# Patient Record
Sex: MALE | Race: WHITE | Employment: OTHER | URBAN - METROPOLITAN AREA
[De-identification: names, ages, dates, MRNs, and addresses within clinical notes are randomized per-mention and may not be internally consistent; named-entity substitution may affect disease eponyms.]

---

## 2017-08-17 ENCOUNTER — LAB ENCOUNTER (OUTPATIENT)
Dept: LAB | Facility: HOSPITAL | Age: 62
End: 2017-08-17
Attending: SPECIALIST
Payer: MEDICARE

## 2017-08-17 DIAGNOSIS — D51.8 OTHER VITAMIN B12 DEFICIENCY ANEMIA: ICD-10-CM

## 2017-08-17 DIAGNOSIS — E55.9 VITAMIN D DEFICIENCY: ICD-10-CM

## 2017-08-17 DIAGNOSIS — M54.15 RADICULOPATHY OF THORACOLUMBAR REGION: ICD-10-CM

## 2017-08-17 DIAGNOSIS — N40.1 BPH ASSOCIATED WITH NOCTURIA: ICD-10-CM

## 2017-08-17 DIAGNOSIS — I10 ESSENTIAL HYPERTENSION, MALIGNANT: Primary | ICD-10-CM

## 2017-08-17 DIAGNOSIS — R35.1 BPH ASSOCIATED WITH NOCTURIA: ICD-10-CM

## 2017-08-17 LAB
25-HYDROXYVITAMIN D (TOTAL): 23.7 NG/ML (ref 30–100)
ALBUMIN SERPL-MCNC: 3.9 G/DL (ref 3.5–4.8)
ALP LIVER SERPL-CCNC: 70 U/L (ref 45–117)
ALT SERPL-CCNC: 17 U/L (ref 17–63)
AST SERPL-CCNC: 13 U/L (ref 15–41)
BASOPHILS # BLD AUTO: 0.06 X10(3) UL (ref 0–0.1)
BASOPHILS NFR BLD AUTO: 0.7 %
BILIRUB SERPL-MCNC: 0.3 MG/DL (ref 0.1–2)
BUN BLD-MCNC: 16 MG/DL (ref 8–20)
CALCIUM BLD-MCNC: 9.4 MG/DL (ref 8.3–10.3)
CHLORIDE: 106 MMOL/L (ref 101–111)
CHOLEST SMN-MCNC: 169 MG/DL (ref ?–200)
CO2: 28 MMOL/L (ref 22–32)
CREAT BLD-MCNC: 0.85 MG/DL (ref 0.7–1.3)
EOSINOPHIL # BLD AUTO: 0.41 X10(3) UL (ref 0–0.3)
EOSINOPHIL NFR BLD AUTO: 4.7 %
ERYTHROCYTE [DISTWIDTH] IN BLOOD BY AUTOMATED COUNT: 13.1 % (ref 11.5–16)
EST. AVERAGE GLUCOSE BLD GHB EST-MCNC: 123 MG/DL (ref 68–126)
FREE T4: 1.1 NG/DL (ref 0.9–1.8)
GLUCOSE BLD-MCNC: 89 MG/DL (ref 70–99)
HAV AB SERPL IA-ACNC: 432 PG/ML (ref 193–986)
HBA1C MFR BLD HPLC: 5.9 % (ref ?–5.7)
HCT VFR BLD AUTO: 42.4 % (ref 37–53)
HDLC SERPL-MCNC: 42 MG/DL (ref 45–?)
HDLC SERPL: 4.02 {RATIO} (ref ?–4.97)
HGB BLD-MCNC: 14.1 G/DL (ref 13–17)
IMMATURE GRANULOCYTE COUNT: 0.04 X10(3) UL (ref 0–1)
IMMATURE GRANULOCYTE RATIO %: 0.5 %
LDLC SERPL CALC-MCNC: 107 MG/DL (ref ?–130)
LDLC SERPL-MCNC: 20 MG/DL (ref 5–40)
LYMPHOCYTES # BLD AUTO: 3.13 X10(3) UL (ref 0.9–4)
LYMPHOCYTES NFR BLD AUTO: 35.5 %
M PROTEIN MFR SERPL ELPH: 7.3 G/DL (ref 6.1–8.3)
MCH RBC QN AUTO: 29.6 PG (ref 27–33.2)
MCHC RBC AUTO-ENTMCNC: 33.3 G/DL (ref 31–37)
MCV RBC AUTO: 89.1 FL (ref 80–99)
MONOCYTES # BLD AUTO: 0.85 X10(3) UL (ref 0.1–0.6)
MONOCYTES NFR BLD AUTO: 9.6 %
NEUTROPHIL ABS PRELIM: 4.32 X10 (3) UL (ref 1.3–6.7)
NEUTROPHILS # BLD AUTO: 4.32 X10(3) UL (ref 1.3–6.7)
NEUTROPHILS NFR BLD AUTO: 49 %
NONHDLC SERPL-MCNC: 127 MG/DL (ref ?–130)
PLATELET # BLD AUTO: 147 10(3)UL (ref 150–450)
POTASSIUM SERPL-SCNC: 4.1 MMOL/L (ref 3.6–5.1)
PSA SERPL-MCNC: 1.73 NG/ML (ref 0.01–4)
RBC # BLD AUTO: 4.76 X10(6)UL (ref 4.3–5.7)
RED CELL DISTRIBUTION WIDTH-SD: 42.7 FL (ref 35.1–46.3)
SODIUM SERPL-SCNC: 140 MMOL/L (ref 136–144)
TRIGLYCERIDES: 102 MG/DL (ref ?–150)
TSI SER-ACNC: 1.06 MIU/ML (ref 0.35–5.5)
WBC # BLD AUTO: 8.8 X10(3) UL (ref 4–13)

## 2017-08-17 PROCEDURE — 83036 HEMOGLOBIN GLYCOSYLATED A1C: CPT

## 2017-08-17 PROCEDURE — 82607 VITAMIN B-12: CPT

## 2017-08-17 PROCEDURE — 85025 COMPLETE CBC W/AUTO DIFF WBC: CPT

## 2017-08-17 PROCEDURE — 84153 ASSAY OF PSA TOTAL: CPT

## 2017-08-17 PROCEDURE — 36415 COLL VENOUS BLD VENIPUNCTURE: CPT

## 2017-08-17 PROCEDURE — 80061 LIPID PANEL: CPT

## 2017-08-17 PROCEDURE — 84443 ASSAY THYROID STIM HORMONE: CPT

## 2017-08-17 PROCEDURE — 82306 VITAMIN D 25 HYDROXY: CPT

## 2017-08-17 PROCEDURE — 80053 COMPREHEN METABOLIC PANEL: CPT

## 2017-08-17 PROCEDURE — 84439 ASSAY OF FREE THYROXINE: CPT

## 2017-08-28 ENCOUNTER — TELEPHONE (OUTPATIENT)
Dept: SURGERY | Facility: CLINIC | Age: 62
End: 2017-08-28

## 2018-04-24 ENCOUNTER — HOSPITAL ENCOUNTER (OUTPATIENT)
Dept: NUCLEAR MEDICINE | Facility: HOSPITAL | Age: 63
Discharge: HOME | End: 2018-04-24
Attending: ANESTHESIOLOGY
Payer: MEDICARE

## 2018-04-24 DIAGNOSIS — M54.50 LOW BACK PAIN: ICD-10-CM

## 2018-04-24 PROCEDURE — 78320 NM BONE SPECT WITH CT (CPT=78306/78320/78399): CPT | Performed by: ANESTHESIOLOGY

## 2018-04-24 PROCEDURE — 78399 UNLISTED MUSCSKEL PX DX NUC: CPT | Performed by: ANESTHESIOLOGY

## 2018-04-24 PROCEDURE — 78306 BONE IMAGING WHOLE BODY: CPT | Performed by: ANESTHESIOLOGY

## 2022-08-17 ENCOUNTER — TELEPHONE (OUTPATIENT)
Dept: OTHER | Age: 67
End: 2022-08-17

## 2022-08-29 ENCOUNTER — OFFICE VISIT (OUTPATIENT)
Facility: LOCATION | Age: 67
End: 2022-08-29
Payer: MEDICARE

## 2022-08-29 VITALS — TEMPERATURE: 98 F | HEART RATE: 79 BPM

## 2022-08-29 DIAGNOSIS — K40.20 BILATERAL INGUINAL HERNIA WITHOUT OBSTRUCTION OR GANGRENE, RECURRENCE NOT SPECIFIED: Primary | ICD-10-CM

## 2022-08-29 DIAGNOSIS — M79.7 FIBROMYALGIA: ICD-10-CM

## 2022-08-29 DIAGNOSIS — M96.1 POSTLAMINECTOMY SYNDROME, LUMBAR REGION: ICD-10-CM

## 2022-08-29 DIAGNOSIS — G89.4 CHRONIC PAIN SYNDROME: ICD-10-CM

## 2022-08-29 DIAGNOSIS — K42.9 UMBILICAL HERNIA WITHOUT OBSTRUCTION AND WITHOUT GANGRENE: ICD-10-CM

## 2022-08-29 PROCEDURE — 99204 OFFICE O/P NEW MOD 45 MIN: CPT | Performed by: COLON & RECTAL SURGERY

## 2022-08-29 NOTE — PATIENT INSTRUCTIONS
The patient presents in consultation of Dr. Nam Bess for evaluation of a right inguinal hernia. The patient states he has had a right inguinal hernia for many years. He states it has increased in size over the years. It increases with standing and Valsalva maneuver. It reduces when laying down. The hernia is entirely asymptomatic. He denies any pain at the site. He denies any abdominal pain or distention. He denies nausea or vomiting. He denies issues with diarrhea or constipation. The patient states he has some difficulty urinating due to a history of BPH and bladder cancer. He followed with Dr. Tara Reyes regarding his bladder cancer. He had a cystoscopy with excision of a tumor on 8/27/2016. He was treated with bacillus Calmette-Robert immunotherapy. He had a repeat cystoscopy on 12/1/2016, with further bladder biopsies. He has had no further treatment for his bladder cancer. The patient has a past medical history significant for fibromyalgia, chronic pain, spinal stenosis and postlaminectomy syndrome in the lumbar spine. He does not take any blood thinners. The patient has had no prior abdominal operations. Clinical examination of the abdomen reveals it to be soft, nondistended, nontender, bowel sounds are normal activity normal pitch. There  is no rebounding tenderness or guarding. There are no signs of ascites or peritonitis. The liver and spleen are nonpalpable. There are no palpable masses. The patient has a large right inguinal hernia with a 10 cm hernia sac and a small left inguinal hernia with a 5 cm hernia sac. He also has a small umbilical hernia with an approximately 3 cm hernia sac    The patient will be scheduled to undergo a laparoscopic bilateral inguinal hernia with mesh and an umbilical herniorrhaphy at [facility] in [facility].     I instructed the patient to inform the surgical team that he has spinal issues prior to his operation, so his issues are not further exacerbated during transfers. All risks, benefits, complications and alternatives to the proposed procedure(s) were fully discussed with the patient. All questions from the patient were answered in detail. A description of the procedure(s) possible outcomes was fully discussed. The patient seemed to understand the conversation and its details. Consent for the procedure(s) was confirmed with the patient.

## 2022-10-14 RX ORDER — TRIAMTERENE AND HYDROCHLOROTHIAZIDE 37.5; 25 MG/1; MG/1
1 TABLET ORAL EVERY MORNING
COMMUNITY
Start: 2022-05-11

## 2022-10-14 RX ORDER — ALBUTEROL SULFATE 90 UG/1
2 AEROSOL, METERED RESPIRATORY (INHALATION) EVERY 4 HOURS PRN
COMMUNITY
Start: 2022-05-11

## 2022-10-14 RX ORDER — AMLODIPINE BESYLATE 5 MG/1
5 TABLET ORAL DAILY
COMMUNITY
Start: 2022-08-09

## 2022-10-14 RX ORDER — FLUTICASONE PROPIONATE 50 MCG
2 SPRAY, SUSPENSION (ML) NASAL DAILY
COMMUNITY
Start: 2022-05-17

## 2022-10-14 RX ORDER — FLUTICASONE FUROATE AND VILANTEROL TRIFENATATE 100; 25 UG/1; UG/1
1 POWDER RESPIRATORY (INHALATION) DAILY
COMMUNITY
Start: 2022-05-11

## 2022-10-18 ENCOUNTER — ANESTHESIA EVENT (OUTPATIENT)
Dept: SURGERY | Facility: HOSPITAL | Age: 67
End: 2022-10-18
Payer: MEDICARE

## 2022-10-19 ENCOUNTER — ANESTHESIA (OUTPATIENT)
Dept: SURGERY | Facility: HOSPITAL | Age: 67
End: 2022-10-19
Payer: MEDICARE

## 2022-10-19 ENCOUNTER — HOSPITAL ENCOUNTER (OUTPATIENT)
Facility: HOSPITAL | Age: 67
Setting detail: HOSPITAL OUTPATIENT SURGERY
Discharge: HOME | End: 2022-10-19
Attending: COLON & RECTAL SURGERY | Admitting: COLON & RECTAL SURGERY
Payer: MEDICARE

## 2022-10-19 VITALS
SYSTOLIC BLOOD PRESSURE: 156 MMHG | RESPIRATION RATE: 18 BRPM | WEIGHT: 166 LBS | DIASTOLIC BLOOD PRESSURE: 90 MMHG | HEART RATE: 78 BPM | TEMPERATURE: 99 F | HEIGHT: 67 IN | OXYGEN SATURATION: 92 % | BODY MASS INDEX: 26.06 KG/M2

## 2022-10-19 DIAGNOSIS — Z20.822 ENCOUNTER FOR PREOPERATIVE SCREENING LABORATORY TESTING FOR COVID-19 VIRUS: Primary | ICD-10-CM

## 2022-10-19 DIAGNOSIS — K40.20 BILATERAL INGUINAL HERNIA WITHOUT OBSTRUCTION OR GANGRENE, RECURRENCE NOT SPECIFIED: ICD-10-CM

## 2022-10-19 DIAGNOSIS — Z01.812 ENCOUNTER FOR PREOPERATIVE SCREENING LABORATORY TESTING FOR COVID-19 VIRUS: Primary | ICD-10-CM

## 2022-10-19 LAB
ANION GAP SERPL CALC-SCNC: 4 MMOL/L (ref 0–18)
ATRIAL RATE: 79 BPM
BUN BLD-MCNC: 23 MG/DL (ref 7–18)
CALCIUM BLD-MCNC: 9.1 MG/DL (ref 8.5–10.1)
CHLORIDE SERPL-SCNC: 110 MMOL/L (ref 98–112)
CO2 SERPL-SCNC: 26 MMOL/L (ref 21–32)
CREAT BLD-MCNC: 1 MG/DL
GFR SERPLBLD BASED ON 1.73 SQ M-ARVRAT: 83 ML/MIN/1.73M2 (ref 60–?)
GLUCOSE BLD-MCNC: 135 MG/DL (ref 70–99)
OSMOLALITY SERPL CALC.SUM OF ELEC: 296 MOSM/KG (ref 275–295)
P AXIS: 69 DEGREES
P-R INTERVAL: 156 MS
POTASSIUM SERPL-SCNC: 4.1 MMOL/L (ref 3.5–5.1)
Q-T INTERVAL: 398 MS
QRS DURATION: 94 MS
QTC CALCULATION (BEZET): 456 MS
R AXIS: -8 DEGREES
SARS-COV-2 RNA RESP QL NAA+PROBE: NOT DETECTED
SODIUM SERPL-SCNC: 140 MMOL/L (ref 136–145)
T AXIS: 140 DEGREES
VENTRICULAR RATE: 79 BPM

## 2022-10-19 PROCEDURE — 0YUA4JZ SUPPLEMENT BILATERAL INGUINAL REGION WITH SYNTHETIC SUBSTITUTE, PERCUTANEOUS ENDOSCOPIC APPROACH: ICD-10-PCS | Performed by: COLON & RECTAL SURGERY

## 2022-10-19 PROCEDURE — 93010 ELECTROCARDIOGRAM REPORT: CPT | Performed by: INTERNAL MEDICINE

## 2022-10-19 PROCEDURE — 80048 BASIC METABOLIC PNL TOTAL CA: CPT

## 2022-10-19 PROCEDURE — 0WQF4ZZ REPAIR ABDOMINAL WALL, PERCUTANEOUS ENDOSCOPIC APPROACH: ICD-10-PCS | Performed by: COLON & RECTAL SURGERY

## 2022-10-19 PROCEDURE — 93005 ELECTROCARDIOGRAM TRACING: CPT

## 2022-10-19 DEVICE — BARD MESH
Type: IMPLANTABLE DEVICE | Site: INGUINAL | Status: FUNCTIONAL
Brand: BARD MESH

## 2022-10-19 RX ORDER — SODIUM CHLORIDE, SODIUM LACTATE, POTASSIUM CHLORIDE, CALCIUM CHLORIDE 600; 310; 30; 20 MG/100ML; MG/100ML; MG/100ML; MG/100ML
INJECTION, SOLUTION INTRAVENOUS CONTINUOUS
Status: DISCONTINUED | OUTPATIENT
Start: 2022-10-19 | End: 2022-10-19

## 2022-10-19 RX ORDER — ACETAMINOPHEN AND CODEINE PHOSPHATE 300; 30 MG/1; MG/1
1 TABLET ORAL ONCE AS NEEDED
Status: COMPLETED | OUTPATIENT
Start: 2022-10-19 | End: 2022-10-19

## 2022-10-19 RX ORDER — ONDANSETRON 2 MG/ML
4 INJECTION INTRAMUSCULAR; INTRAVENOUS EVERY 6 HOURS PRN
Status: DISCONTINUED | OUTPATIENT
Start: 2022-10-19 | End: 2022-10-19

## 2022-10-19 RX ORDER — METOPROLOL TARTRATE 5 MG/5ML
INJECTION INTRAVENOUS AS NEEDED
Status: DISCONTINUED | OUTPATIENT
Start: 2022-10-19 | End: 2022-10-19 | Stop reason: SURG

## 2022-10-19 RX ORDER — CEFAZOLIN SODIUM/WATER 2 G/20 ML
2 SYRINGE (ML) INTRAVENOUS ONCE
Status: COMPLETED | OUTPATIENT
Start: 2022-10-19 | End: 2022-10-19

## 2022-10-19 RX ORDER — LABETALOL HYDROCHLORIDE 5 MG/ML
INJECTION, SOLUTION INTRAVENOUS AS NEEDED
Status: DISCONTINUED | OUTPATIENT
Start: 2022-10-19 | End: 2022-10-19 | Stop reason: SURG

## 2022-10-19 RX ORDER — BUPIVACAINE HYDROCHLORIDE AND EPINEPHRINE 5; 5 MG/ML; UG/ML
INJECTION, SOLUTION EPIDURAL; INTRACAUDAL; PERINEURAL AS NEEDED
Status: DISCONTINUED | OUTPATIENT
Start: 2022-10-19 | End: 2022-10-19 | Stop reason: HOSPADM

## 2022-10-19 RX ORDER — MIDAZOLAM HYDROCHLORIDE 1 MG/ML
INJECTION INTRAMUSCULAR; INTRAVENOUS AS NEEDED
Status: DISCONTINUED | OUTPATIENT
Start: 2022-10-19 | End: 2022-10-19 | Stop reason: SURG

## 2022-10-19 RX ORDER — HYDROCODONE BITARTRATE AND ACETAMINOPHEN 5; 325 MG/1; MG/1
1 TABLET ORAL EVERY 6 HOURS PRN
Qty: 12 TABLET | Refills: 0 | Status: SHIPPED | OUTPATIENT
Start: 2022-10-19

## 2022-10-19 RX ORDER — HYDROMORPHONE HYDROCHLORIDE 1 MG/ML
INJECTION, SOLUTION INTRAMUSCULAR; INTRAVENOUS; SUBCUTANEOUS
Status: COMPLETED
Start: 2022-10-19 | End: 2022-10-19

## 2022-10-19 RX ORDER — ACETAMINOPHEN 500 MG
1000 TABLET ORAL ONCE AS NEEDED
Status: COMPLETED | OUTPATIENT
Start: 2022-10-19 | End: 2022-10-19

## 2022-10-19 RX ORDER — DEXAMETHASONE SODIUM PHOSPHATE 4 MG/ML
VIAL (ML) INJECTION AS NEEDED
Status: DISCONTINUED | OUTPATIENT
Start: 2022-10-19 | End: 2022-10-19 | Stop reason: SURG

## 2022-10-19 RX ORDER — METOCLOPRAMIDE HYDROCHLORIDE 5 MG/ML
10 INJECTION INTRAMUSCULAR; INTRAVENOUS EVERY 8 HOURS PRN
Status: DISCONTINUED | OUTPATIENT
Start: 2022-10-19 | End: 2022-10-19

## 2022-10-19 RX ORDER — LIDOCAINE HYDROCHLORIDE 10 MG/ML
INJECTION, SOLUTION EPIDURAL; INFILTRATION; INTRACAUDAL; PERINEURAL AS NEEDED
Status: DISCONTINUED | OUTPATIENT
Start: 2022-10-19 | End: 2022-10-19 | Stop reason: SURG

## 2022-10-19 RX ORDER — ACETAMINOPHEN AND CODEINE PHOSPHATE 300; 30 MG/1; MG/1
2 TABLET ORAL ONCE AS NEEDED
Status: COMPLETED | OUTPATIENT
Start: 2022-10-19 | End: 2022-10-19

## 2022-10-19 RX ORDER — ACETAMINOPHEN 500 MG
1000 TABLET ORAL ONCE
Status: DISCONTINUED | OUTPATIENT
Start: 2022-10-19 | End: 2022-10-19 | Stop reason: HOSPADM

## 2022-10-19 RX ORDER — HYDROMORPHONE HYDROCHLORIDE 1 MG/ML
0.6 INJECTION, SOLUTION INTRAMUSCULAR; INTRAVENOUS; SUBCUTANEOUS EVERY 5 MIN PRN
Status: DISCONTINUED | OUTPATIENT
Start: 2022-10-19 | End: 2022-10-19

## 2022-10-19 RX ORDER — HEPARIN SODIUM 5000 [USP'U]/ML
5000 INJECTION, SOLUTION INTRAVENOUS; SUBCUTANEOUS ONCE
Status: COMPLETED | OUTPATIENT
Start: 2022-10-19 | End: 2022-10-19

## 2022-10-19 RX ORDER — HYDROMORPHONE HYDROCHLORIDE 1 MG/ML
0.4 INJECTION, SOLUTION INTRAMUSCULAR; INTRAVENOUS; SUBCUTANEOUS EVERY 5 MIN PRN
Status: DISCONTINUED | OUTPATIENT
Start: 2022-10-19 | End: 2022-10-19

## 2022-10-19 RX ORDER — PHENYLEPHRINE HCL 10 MG/ML
VIAL (ML) INJECTION AS NEEDED
Status: DISCONTINUED | OUTPATIENT
Start: 2022-10-19 | End: 2022-10-19 | Stop reason: SURG

## 2022-10-19 RX ORDER — HYDROMORPHONE HYDROCHLORIDE 1 MG/ML
0.2 INJECTION, SOLUTION INTRAMUSCULAR; INTRAVENOUS; SUBCUTANEOUS EVERY 5 MIN PRN
Status: DISCONTINUED | OUTPATIENT
Start: 2022-10-19 | End: 2022-10-19

## 2022-10-19 RX ORDER — NALOXONE HYDROCHLORIDE 0.4 MG/ML
80 INJECTION, SOLUTION INTRAMUSCULAR; INTRAVENOUS; SUBCUTANEOUS AS NEEDED
Status: DISCONTINUED | OUTPATIENT
Start: 2022-10-19 | End: 2022-10-19

## 2022-10-19 RX ORDER — ROCURONIUM BROMIDE 10 MG/ML
INJECTION, SOLUTION INTRAVENOUS AS NEEDED
Status: DISCONTINUED | OUTPATIENT
Start: 2022-10-19 | End: 2022-10-19 | Stop reason: SURG

## 2022-10-19 RX ORDER — ONDANSETRON 2 MG/ML
INJECTION INTRAMUSCULAR; INTRAVENOUS AS NEEDED
Status: DISCONTINUED | OUTPATIENT
Start: 2022-10-19 | End: 2022-10-19 | Stop reason: SURG

## 2022-10-19 RX ADMIN — PHENYLEPHRINE HCL 50 MCG: 10 MG/ML VIAL (ML) INJECTION at 10:06:00

## 2022-10-19 RX ADMIN — CEFAZOLIN SODIUM/WATER 2 G: 2 G/20 ML SYRINGE (ML) INTRAVENOUS at 09:43:00

## 2022-10-19 RX ADMIN — MIDAZOLAM HYDROCHLORIDE 2 MG: 1 INJECTION INTRAMUSCULAR; INTRAVENOUS at 09:49:00

## 2022-10-19 RX ADMIN — LABETALOL HYDROCHLORIDE 10 MG: 5 INJECTION, SOLUTION INTRAVENOUS at 10:40:00

## 2022-10-19 RX ADMIN — DEXAMETHASONE SODIUM PHOSPHATE 4 MG: 4 MG/ML VIAL (ML) INJECTION at 09:56:00

## 2022-10-19 RX ADMIN — ROCURONIUM BROMIDE 50 MG: 10 INJECTION, SOLUTION INTRAVENOUS at 09:51:00

## 2022-10-19 RX ADMIN — METOPROLOL TARTRATE 2 MG: 5 INJECTION INTRAVENOUS at 10:09:00

## 2022-10-19 RX ADMIN — SODIUM CHLORIDE, SODIUM LACTATE, POTASSIUM CHLORIDE, CALCIUM CHLORIDE: 600; 310; 30; 20 INJECTION, SOLUTION INTRAVENOUS at 11:30:00

## 2022-10-19 RX ADMIN — LIDOCAINE HYDROCHLORIDE 25 MG: 10 INJECTION, SOLUTION EPIDURAL; INFILTRATION; INTRACAUDAL; PERINEURAL at 09:51:00

## 2022-10-19 RX ADMIN — ONDANSETRON 4 MG: 2 INJECTION INTRAMUSCULAR; INTRAVENOUS at 09:56:00

## 2022-10-19 RX ADMIN — SODIUM CHLORIDE, SODIUM LACTATE, POTASSIUM CHLORIDE, CALCIUM CHLORIDE: 600; 310; 30; 20 INJECTION, SOLUTION INTRAVENOUS at 09:44:00

## 2022-10-19 NOTE — OPERATIVE REPORT
[facility]  Operative Note    [facility] Location: OR   [facility] [id number] MRN [id number]   Admission Date 10/19/2022 Operation Date 10/19/2022   Attending Physician Gricel Judd MD Operating Physician Moraima Stewart MD     Pre-Operative Diagnosis: Bilateral inguinal hernia without obstruction or gangrene, recurrence not specified [S64.81], umbilical hernia    Post-Operative Diagnosis: Same as above    Procedure Performed:  Bilateral Laparoscopic Inguinal Hernia Repair with Mesh, umbilical hernia repair    Surgeon(s) and Role:     Carmen Eugene MD - Primary  Assistant: Perla Chapin PA-C  The assistance of Perla Chapin PA-C, was absolutely essential to the proper conduct of this case and further assisted with exact proper positioning of the mesh during tac application and complex dissection within the groin structures and anatomy. Anesthesia: General    History of Present Illness: This patient has a very large right inguinal hernia, smaller left inguinal hernia, and a very small umbilical hernia. He presents at this time for repair of all 3. Operative Findings: This patient was found to have a left small direct inguinal hernia; and a right giant direct inguinal hernia  He has a 8 mm fascial defect with a 2 cm sac at the umbilicus. There were small bowel contents within the right large hernia. Diagnostic laparoscopy revealed no significant other findings  Liver within normal limits  No significant adhesions  The patient was delivered to recovery room in stable condition    Description of Procedure: Following adequate general anesthesia, the patient was placed in the supine position on the operating room table. The abdomen was scrubbed with Chlorhexidine. Sterile drapes were placed with wide exposure of the abdomen from xiphoid to pubis. A small supraumbilical incision was made.   The hernia sac at the umbilicus was dissected free from surrounding structures, the umbilicus was inverted off of the anterior fascia. The 1 cm defect was then used for our 12 mm port site. We inserted the trocar, insufflated the abdomen. The laparoscopic findings are noted above. Two other trocars were placed in a symmetric fashion lateral to the rectus sheath near the level of the umbilicus. Each hernia was approached in an identical fashion. We began each procedure with a transverse incision through the peritoneum at the level of the pelvic floor, above the hernia defect, and above the pelvic floor structures. The peritoneum was then dissected back, inverting the hernia sac into the abdominal cavity. We then performed careful dissection identifying the pubic tubercle, Royal's ligament, the iliopubic tract, and the posterior rectus fascia. The Marlex mesh grafts were then fashioned to fit the hernia defect. They were secured medially to the pubic tubercle, posteriorly to Royal's ligament, laterally above the iliopubic tract, and anteriorly to the posterior rectus fascia. Once this was accomplished, the peritoneum was then closed over the mesh repair. All of this was accomplished using the hernia tacker device. We then began with closure of the abdomen. All laparoscopic instruments were removed from the patient and accounted for on a side table. The CO2 was suctioned from the abdominal cavity. The umbilical hernia was closed with interrupted #1 Ethibond suture. The umbilicus was tacked down to the anterior surfaces of the anterior fascia with interrupted 2-0 Vicryl. The skin ports and umbilicus were all closed with 5-0 undyed Vicryl in a subcuticular fashion, and 0.5% Marcaine with epinephrine was injected into the wound margins. Sterile dressings were placed, and the patient was transported to recovery in stable postoperative condition.       Complications: None    EBL: 10 cc    Pathologic specimens:  none    Jeffery Lundberg MD  10/19/2022  11:45 AM

## 2022-10-19 NOTE — ANESTHESIA PROCEDURE NOTES
Airway  Date/Time: 10/19/2022 9:55 AM  Urgency: elective      General Information and Staff    Patient location during procedure: OR  Anesthesiologist: Doretha Dorsey MD  Performed: anesthesiologist     Indications and Patient Condition  Indications for airway management: anesthesia  Sedation level: deep  Preoxygenated: yes  Patient position: sniffing  Mask difficulty assessment: 1 - vent by mask    Final Airway Details  Final airway type: endotracheal airway      Successful airway: ETT  Cuffed: yes   Successful intubation technique: direct laryngoscopy  Facilitating devices/methods: intubating stylet  Endotracheal tube insertion site: oral  Blade size: #3  ETT size (mm): 7.0    Cormack-Lehane Classification: grade IIB - view of arytenoids or posterior of glottis only  Placement verified by: chest auscultation and capnometry   Cuff volume (mL): 8  Measured from: lips  Number of attempts at approach: 1

## 2022-10-20 ENCOUNTER — TELEPHONE (OUTPATIENT)
Facility: LOCATION | Age: 67
End: 2022-10-20

## 2022-11-07 ENCOUNTER — OFFICE VISIT (OUTPATIENT)
Facility: LOCATION | Age: 67
End: 2022-11-07

## 2022-11-07 VITALS — TEMPERATURE: 98 F | HEART RATE: 81 BPM

## 2022-11-07 DIAGNOSIS — K42.9 UMBILICAL HERNIA WITHOUT OBSTRUCTION AND WITHOUT GANGRENE: ICD-10-CM

## 2022-11-07 DIAGNOSIS — K40.20 BILATERAL INGUINAL HERNIA WITHOUT OBSTRUCTION OR GANGRENE, RECURRENCE NOT SPECIFIED: Primary | ICD-10-CM

## 2024-08-20 ENCOUNTER — OFFICE VISIT (OUTPATIENT)
Facility: LOCATION | Age: 69
End: 2024-08-20
Payer: MEDICARE

## 2024-08-20 VITALS — TEMPERATURE: 98 F | HEART RATE: 71 BPM

## 2024-08-20 DIAGNOSIS — R22.31 AXILLARY MASS, RIGHT: Primary | ICD-10-CM

## 2024-08-20 DIAGNOSIS — R19.00 ABDOMINAL MASS, UNSPECIFIED ABDOMINAL LOCATION: ICD-10-CM

## 2024-08-20 PROCEDURE — 99203 OFFICE O/P NEW LOW 30 MIN: CPT | Performed by: STUDENT IN AN ORGANIZED HEALTH CARE EDUCATION/TRAINING PROGRAM

## 2024-08-20 NOTE — H&P
New Patient Visit Note       Active Problems      1. Axillary mass, right    2. Abdominal mass, unspecified abdominal location        Chief Complaint   Chief Complaint   Patient presents with    New Patient     NP-Right  Axillary Lump- denies pain or drainage, denies imaging        History of Present Illness   69 year old male who is here for evaluation of right axillary mass and right abdominal wall deformity . He reports noting these findings over the past few months. Both these findings cause no pain or discomfort. He denies any discharge, fever, chills or any other symptoms. He has no other masses in any other part of his body. He denies any family history of breast cancer.      Allergies  Wolfgang has No Known Allergies.    Past Medical / Surgical / Social / Family History    The past medical and past surgical history have been reviewed by me today.    Past Medical History:    Back problem    CANCER    bladder cancer    Cervical spinal stenosis    Chronic pain syndrome    DEPRESSION    Depression    Fibromyalgia    Herniated disc, cervical    High blood pressure    Lumbar herniated disc    Lumbar spinal stenosis    Malignant neoplasm of other specified sites of bladder    2005    Osteoarthritis    everywhere    Osteoarthritis of cervical spine    Pulmonary embolism (HCC)    about 15 years ago- surgery in the heel    Tobacco use disorder    Unspecified joint disorder of multiple sites    Visual impairment    glasses     Past Surgical History:   Procedure Laterality Date    Colonoscopy      Laminectomy,lumbar      Other Right     heel surgery    Other  09/13/2019    BTS cysto-Dr. Sanders     Other surgical history  12-12-08    bts Dr. sarina mukherjee    Other surgical history  10-9-09    bts Dr. sarina mukherjee    Other surgical history  10-15-10    bts dr sarina mukherjee    Other surgical history  2/8/11    bts johnathan Sanders    Other surgical history  6-    BTS cysto - Dr. Sanders     Other surgical history   5/11/12    BTS Cysto Dr. Sanders     Other surgical history  11/08/2013    BTS Cysto- Dr. Sanders    Other surgical history  04/17/15    Cystoscopy - Dr. Sanders     Other surgical history  08/11/2017    BTS Cysto - Dr Sanders    Other surgical history  02/07/2018    Cystoscopy-Dr. Sanders     Other surgical history Left     left fibula fx- plate and screws    Special service or report  2005    Benign bladder tumor    Special service or report  2006    Benign bladder tumor       The family history and social history have been reviewed by me today.    Family History   Problem Relation Age of Onset    Heart Disease Father         CHF    Cancer Brother         leukemia     Social History     Socioeconomic History    Marital status: Unknown   Tobacco Use    Smoking status: Every Day     Current packs/day: 0.50     Average packs/day: 0.5 packs/day for 40.0 years (20.0 ttl pk-yrs)     Types: Cigarettes    Smokeless tobacco: Never   Vaping Use    Vaping status: Never Used   Substance and Sexual Activity    Alcohol use: No     Alcohol/week: 0.0 standard drinks of alcohol    Drug use: No    Sexual activity: Yes     Partners: Female        Current Outpatient Medications:     HYDROcodone-acetaminophen (NORCO) 5-325 MG Oral Tab, Take 1 tablet by mouth every 6 (six) hours as needed for Pain. (Patient not taking: Reported on 8/20/2024), Disp: 12 tablet, Rfl: 0    BREO ELLIPTA 100-25 MCG/INH Inhalation Aerosol Powder, Breath Activated, Inhale 1 puff into the lungs daily., Disp: , Rfl:     albuterol 108 (90 Base) MCG/ACT Inhalation Aero Soln, Inhale 2 puffs into the lungs every 4 (four) hours as needed., Disp: , Rfl:     fluticasone propionate 50 MCG/ACT Nasal Suspension, 2 sprays by Nasal route daily., Disp: , Rfl:     Triamterene-HCTZ 37.5-25 MG Oral Tab, Take 1 tablet by mouth every morning., Disp: , Rfl:     amLODIPine 5 MG Oral Tab, Take 5 mg by mouth daily., Disp: , Rfl:     LYRICA 75 MG Oral Cap, TK 1 C PO BID, Disp: , Rfl:  2    Metoprolol Tartrate 50 MG Oral Tab, Take 50 mg by mouth daily., Disp: , Rfl: 4    hydrALAzine HCl 25 MG Oral Tab, TK 1 T PO TID., Disp: , Rfl: 3    Vitamin D, Ergocalciferol, 70715 UNITS Oral Cap, Take 1 Cap by mouth once a week., Disp: 4 Cap, Rfl: 3    Zolpidem Tartrate 10 MG Oral Tab, Take 1 Tab by mouth nightly as needed for Sleep., Disp: 30 Tab, Rfl: 5    DULoxetine HCl (CYMBALTA) 30 MG Oral Cap DR Particles, Take 1 Cap by mouth daily., Disp: 30 Cap, Rfl: 12    Tamsulosin HCl 0.4 MG Oral Cap, TAKE ONE CAPSULE BY MOUTH EVERY NIGHT AT BEDTIME, Disp: 90 Cap, Rfl: 3      Review of Systems  The Review of Systems has been reviewed by me during today.  Review of Systems   Constitutional:  Negative for chills, diaphoresis, fatigue and fever.   HENT:  Negative for ear discharge, ear pain and sore throat.    Eyes:  Negative for pain and discharge.   Respiratory:  Negative for cough, chest tightness and shortness of breath.    Cardiovascular:  Negative for chest pain, palpitations and leg swelling.   Gastrointestinal:  Negative for abdominal distention, abdominal pain, blood in stool, constipation, diarrhea, nausea and vomiting.   Genitourinary:  Negative for dysuria, frequency, hematuria and urgency.   Skin:  Negative for color change, pallor and rash.   Neurological:  Negative for weakness, light-headedness, numbness and headaches.   Hematological:  Negative for adenopathy. Does not bruise/bleed easily.   Psychiatric/Behavioral:  Negative for agitation and confusion.        Physical Findings   Pulse 71   Temp 98 °F (36.7 °C) (Temporal)   Physical Exam  Chest:      Chest wall: No mass, deformity, swelling or tenderness.   Breasts:     Breasts are symmetrical.      Right: No inverted nipple, mass, nipple discharge, skin change or tenderness.      Left: No inverted nipple, mass, nipple discharge, skin change or tenderness.   Lymphadenopathy:      Head:      Right side of head: No submental, submandibular,  preauricular, posterior auricular or occipital adenopathy.      Left side of head: No submental, submandibular, preauricular, posterior auricular or occipital adenopathy.      Cervical: No cervical adenopathy.      Right cervical: No superficial, deep or posterior cervical adenopathy.     Left cervical: No superficial, deep or posterior cervical adenopathy.      Upper Body:      Right upper body: No supraclavicular, axillary, pectoral or epitrochlear adenopathy.      Left upper body: No supraclavicular, axillary, pectoral or epitrochlear adenopathy.   Skin:     General: Skin is warm.             Comments: There is a lipomatous and soft mass within the right axilla , non tender, non fluctuant, most consistent with lipomatous mass    There is also a mass like area in the right flank that is slightly prominent compared to the left side, non tender             Assessment/Plan  1. Axillary mass, right    2. Abdominal mass, unspecified abdominal location        Wolfgang De Paz is a 69 year old male referred by Jose Lutz MD for evaluation of right axillary mass and right flank deformity .   There is a lipomatous mass in the right axilla. This does not feel like a lymph node. The breast exam and lymph exam is otherwise normal. He has no symptoms from it. Likely a benign lipomatous mass. Will obtain an ultrasound to better characterize the area.     There is also a contouring deformity of the right flank. This does not have the appearance of a hernia however I am unable to discern if there is descrete spigallian hernia in that area. For this reason I will obtain a CT scan of the abdomen and pelvis to better characterize the abdominal wall muscles.     Follow up once imaging studies are complete.      No orders of the defined types were placed in this encounter.      Imaging & Referrals   US AXILLARY RIGHT (CPT=76882)  CT ABDOMEN+PELVIS(CONTRAST ONLY)(CPT=74177)    Follow Up  After CT and ultrasound are complete    [facility]  MD Griselda

## 2024-08-27 ENCOUNTER — HOSPITAL ENCOUNTER (OUTPATIENT)
Dept: CT IMAGING | Age: 69
Discharge: HOME | End: 2024-08-27
Attending: STUDENT IN AN ORGANIZED HEALTH CARE EDUCATION/TRAINING PROGRAM
Payer: MEDICARE

## 2024-08-27 DIAGNOSIS — R19.00 ABDOMINAL MASS, UNSPECIFIED ABDOMINAL LOCATION: ICD-10-CM

## 2024-08-27 LAB
CREAT BLD-MCNC: 0.9 MG/DL
EGFRCR SERPLBLD CKD-EPI 2021: 93 ML/MIN/1.73M2 (ref 60–?)

## 2024-08-27 PROCEDURE — 82565 ASSAY OF CREATININE: CPT

## 2024-08-27 PROCEDURE — 74177 CT ABD & PELVIS W/CONTRAST: CPT | Performed by: STUDENT IN AN ORGANIZED HEALTH CARE EDUCATION/TRAINING PROGRAM

## 2024-08-27 RX ORDER — IOHEXOL 350 MG/ML
85 INJECTION, SOLUTION INTRAVENOUS
Status: COMPLETED | OUTPATIENT
Start: 2024-08-27 | End: 2024-08-27

## 2024-08-27 RX ADMIN — IOHEXOL 85 ML: 350 INJECTION, SOLUTION INTRAVENOUS at 14:20:00

## 2024-08-28 ENCOUNTER — HOSPITAL ENCOUNTER (OUTPATIENT)
Dept: ULTRASOUND IMAGING | Age: 69
Discharge: HOME | End: 2024-08-28
Attending: STUDENT IN AN ORGANIZED HEALTH CARE EDUCATION/TRAINING PROGRAM
Payer: MEDICARE

## 2024-08-28 DIAGNOSIS — R22.31 AXILLARY MASS, RIGHT: ICD-10-CM

## 2024-08-28 PROCEDURE — 76882 US LMTD JT/FCL EVL NVASC XTR: CPT | Performed by: STUDENT IN AN ORGANIZED HEALTH CARE EDUCATION/TRAINING PROGRAM

## 2024-09-05 ENCOUNTER — OFFICE VISIT (OUTPATIENT)
Facility: LOCATION | Age: 69
End: 2024-09-05
Payer: MEDICARE

## 2024-09-05 VITALS
HEIGHT: 67 IN | RESPIRATION RATE: 14 BRPM | OXYGEN SATURATION: 98 % | HEART RATE: 69 BPM | TEMPERATURE: 99 F | BODY MASS INDEX: 26 KG/M2

## 2024-09-05 DIAGNOSIS — R22.31 AXILLARY MASS, RIGHT: Primary | ICD-10-CM

## 2024-09-05 PROCEDURE — 99213 OFFICE O/P EST LOW 20 MIN: CPT | Performed by: STUDENT IN AN ORGANIZED HEALTH CARE EDUCATION/TRAINING PROGRAM

## 2024-09-05 RX ORDER — CYCLOBENZAPRINE HCL 10 MG
10 TABLET ORAL 3 TIMES DAILY PRN
COMMUNITY
Start: 2024-07-23

## 2024-10-04 NOTE — H&P
New Patient Visit Note       Active Problems      1. Axillary mass, right        Chief Complaint   Chief Complaint   Patient presents with    Test Results     Discuss ordered imaging results  US AXILLARY RIGHT and CT ABDOMEN+PELVIS       History of Present Illness   69 year old male who is here for evaluation of right axilla and right abdominal wall masses. I reviewed the Ultrasound of the axilla and the CT scan in detail. Patient has no symptoms at this time.       Allergies  Wolfgang has No Known Allergies.    Past Medical / Surgical / Social / Family History    The past medical and past surgical history have been reviewed by me today.    Past Medical History:    Back problem    CANCER    bladder cancer    Cervical spinal stenosis    Chronic pain syndrome    DEPRESSION    Depression    Fibromyalgia    Herniated disc, cervical    High blood pressure    Lumbar herniated disc    Lumbar spinal stenosis    Malignant neoplasm of other specified sites of bladder    2005    Osteoarthritis    everywhere    Osteoarthritis of cervical spine    Pulmonary embolism (HCC)    about 15 years ago- surgery in the heel    Tobacco use disorder    Unspecified joint disorder of multiple sites    Visual impairment    glasses     Past Surgical History:   Procedure Laterality Date    Colonoscopy      Laminectomy,lumbar      Other Right     heel surgery    Other  09/13/2019    BTS cystoJuan Sanders     Other surgical history  12-12-08    bts Dr. sarina mukherjee    Other surgical history  10-9-09    bts Dr. sarina mukherjee    Other surgical history  10-15-10    btdr sarina robledo    Other surgical history  2/8/11    btbaljeet Sanders    Other surgical history  6-    BTS cysto Louise Sanders     Other surgical history  5/11/12    BTS Cysttito Sanders     Other surgical history  11/08/2013    BTS CystoLouise Sanders    Other surgical history  04/17/15    Cystoscopy - Dr. Sanders     Other surgical history  08/11/2017    BTS Cysto Louise Herrera  Tommy    Other surgical history  02/07/2018    Cystoscopy-Dr. Sanders     Other surgical history Left     left fibula fx- plate and screws    Special service or report  2005    Benign bladder tumor    Special service or report  2006    Benign bladder tumor       The family history and social history have been reviewed by me today.    Family History   Problem Relation Age of Onset    Heart Disease Father         CHF    Cancer Brother         leukemia     Social History     Socioeconomic History    Marital status: Unknown   Tobacco Use    Smoking status: Every Day     Current packs/day: 0.50     Average packs/day: 0.5 packs/day for 40.0 years (20.0 ttl pk-yrs)     Types: Cigarettes    Smokeless tobacco: Never   Vaping Use    Vaping status: Never Used   Substance and Sexual Activity    Alcohol use: No     Alcohol/week: 0.0 standard drinks of alcohol    Drug use: No    Sexual activity: Yes     Partners: Female        Current Outpatient Medications:     cyclobenzaprine 10 MG Oral Tab, Take 1 tablet (10 mg total) by mouth 3 (three) times daily as needed., Disp: , Rfl:     BREO ELLIPTA 100-25 MCG/INH Inhalation Aerosol Powder, Breath Activated, Inhale 1 puff into the lungs daily., Disp: , Rfl:     albuterol 108 (90 Base) MCG/ACT Inhalation Aero Soln, Inhale 2 puffs into the lungs every 4 (four) hours as needed., Disp: , Rfl:     fluticasone propionate 50 MCG/ACT Nasal Suspension, 2 sprays by Nasal route daily., Disp: , Rfl:     Triamterene-HCTZ 37.5-25 MG Oral Tab, Take 1 tablet by mouth every morning., Disp: , Rfl:     amLODIPine 5 MG Oral Tab, Take 1 tablet (5 mg total) by mouth daily., Disp: , Rfl:     LYRICA 75 MG Oral Cap, TK 1 C PO BID, Disp: , Rfl: 2    Metoprolol Tartrate 50 MG Oral Tab, Take 1 tablet (50 mg total) by mouth daily., Disp: , Rfl: 4    hydrALAzine HCl 25 MG Oral Tab, TK 1 T PO TID., Disp: , Rfl: 3    Vitamin D, Ergocalciferol, 03971 UNITS Oral Cap, Take 1 Cap by mouth once a week., Disp: 4 Cap, Rfl:  3    Zolpidem Tartrate 10 MG Oral Tab, Take 1 Tab by mouth nightly as needed for Sleep., Disp: 30 Tab, Rfl: 5    DULoxetine HCl (CYMBALTA) 30 MG Oral Cap DR Particles, Take 1 Cap by mouth daily., Disp: 30 Cap, Rfl: 12    Tamsulosin HCl 0.4 MG Oral Cap, TAKE ONE CAPSULE BY MOUTH EVERY NIGHT AT BEDTIME, Disp: 90 Cap, Rfl: 3    HYDROcodone-acetaminophen (NORCO) 5-325 MG Oral Tab, Take 1 tablet by mouth every 6 (six) hours as needed for Pain. (Patient not taking: Reported on 8/20/2024), Disp: 12 tablet, Rfl: 0      Review of Systems  The Review of Systems has been reviewed by me during today.  Review of Systems   Constitutional:  Negative for chills, diaphoresis, fatigue and fever.   HENT:  Negative for ear discharge, ear pain and sore throat.    Eyes:  Negative for pain and discharge.   Respiratory:  Negative for cough, chest tightness and shortness of breath.    Cardiovascular:  Negative for chest pain, palpitations and leg swelling.   Gastrointestinal:  Negative for abdominal distention, abdominal pain, blood in stool, constipation, diarrhea, nausea and vomiting.   Genitourinary:  Negative for dysuria, frequency, hematuria and urgency.   Skin:  Negative for color change, pallor and rash.   Neurological:  Negative for weakness, light-headedness, numbness and headaches.   Hematological:  Negative for adenopathy. Does not bruise/bleed easily.   Psychiatric/Behavioral:  Negative for agitation and confusion.        Physical Findings   Pulse 69   Temp 98.7 °F (37.1 °C) (Temporal)   Resp 14   Ht 67\"   SpO2 98%   BMI 26.00 kg/m²   Physical Exam  Constitutional:       Appearance: Normal appearance.   HENT:      Head: Normocephalic and atraumatic.   Cardiovascular:      Pulses: Normal pulses.   Pulmonary:      Effort: Pulmonary effort is normal.   Skin:     General: Skin is warm.      Capillary Refill: Capillary refill takes less than 2 seconds.             Comments: There is a palpable lipomatous mass within the right  axilla, there is not a palpable mass in the right abdominal wall    Neurological:      Mental Status: He is alert and oriented to person, place, and time. Mental status is at baseline.             Assessment/Plan  1. Axillary mass, right        Wolfgang De Paz is a 69 year old male . He underwent an ultrasound of the right axilla and a CT scan of the abdomen and pelvis.   The ultrasound shows normal benign looking lymph nodes without any concern for malignancy . The CT scan does not show a mass within the abdominal wall and does not show any concerning pathology. I reviewed the imaging with the patient and provided reassurance. I do not recommend any further evaluation or treatment at this time. If the right axillary mass is to change in size or texture of develop any symptoms then patient instructed to follow up with me or with his PCP for further evaluation and imaging. Patient verbalized understanding. All questions were answered.     Madai Villalobos MD

## (undated) DEVICE — TROCAR: Brand: KII SHIELDED BLADED ACCESS SYSTEM

## (undated) DEVICE — SPONGE STICK WITH PVP-I: Brand: KENDALL

## (undated) DEVICE — SUT ETHIBOND 1 CT-1 X425H

## (undated) DEVICE — SUT VICRYL 3-0 SH J416H

## (undated) DEVICE — TRAY SURESTEP 16 BARDEX UMETR

## (undated) DEVICE — SOLUTION  .9 1000ML BTL

## (undated) DEVICE — VIOLET BRAIDED (POLYGLACTIN 910), SYNTHETIC ABSORBABLE SUTURE: Brand: COATED VICRYL

## (undated) DEVICE — SUT VICRYL 5-0 PC-1 J834G

## (undated) DEVICE — LIGHT HANDLE

## (undated) DEVICE — TUBING MEGADYNE LAPAROSCOPIC

## (undated) DEVICE — TROCAR: Brand: KII SLEEVE

## (undated) DEVICE — ENDOPATH ULTRA VERESS INSUFFLATION NEEDLES WITH LUER LOCK CONNECTORS: Brand: ENDOPATH

## (undated) DEVICE — APPLICATOR CHLORAPREP 26ML

## (undated) DEVICE — 40580 - THE PINK PAD - ADVANCED TRENDELENBURG POSITIONING KIT: Brand: 40580 - THE PINK PAD - ADVANCED TRENDELENBURG POSITIONING KIT

## (undated) DEVICE — GENERAL LAPAROS CDS-LF: Brand: MEDLINE INDUSTRIES, INC.

## (undated) DEVICE — SLEEVE KENDALL SCD EXPRESS MED

## (undated) DEVICE — CAPSURE PERMANENT FIXATION SYSTEM 30 PERMANENT FASTENERS: Brand: CAPSURE PERMANENT FIXATION SYSTEM

## (undated) DEVICE — MONOFILAMENT ABSORBABLE SUTURE: Brand: MAXON

## (undated) DEVICE — STERILE POLYISOPRENE POWDER-FREE SURGICAL GLOVES: Brand: PROTEXIS

## (undated) NOTE — LETTER
22    Patient: Jose Ortega  : 1955 Visit date: 2022    Dear  Erik Olivia MD    Thank you for referring Jose Ortega to my practice. Please find my assessment and plan below. Assessment   Bilateral inguinal hernia without obstruction or gangrene, recurrence not specified  (primary encounter diagnosis)  Umbilical hernia without obstruction and without gangrene  Fibromyalgia  Chronic pain syndrome  Postlaminectomy syndrome, lumbar region      Plan   The patient presents in consultation of Dr. Paul Fernandes for evaluation of a right inguinal hernia. The patient states he has had a right inguinal hernia for many years. He states it has increased in size over the years. It increases with standing and Valsalva maneuver. It reduces when laying down. The hernia is entirely asymptomatic. He denies any pain at the site. He denies any abdominal pain or distention. He denies nausea or vomiting. He denies issues with diarrhea or constipation. The patient states he has some difficulty urinating due to a history of BPH and bladder cancer. He followed with Dr. Danii Braxton regarding his bladder cancer. He had a cystoscopy with excision of a tumor on 2016. He was treated with bacillus Calmette-Robert immunotherapy. He had a repeat cystoscopy on 2016, with further bladder biopsies. He has had no further treatment for his bladder cancer. The patient has a past medical history significant for fibromyalgia, chronic pain, spinal stenosis and postlaminectomy syndrome in the lumbar spine. He does not take any blood thinners. The patient has had no prior abdominal operations. Clinical examination of the abdomen reveals it to be soft, nondistended, nontender, bowel sounds are normal activity normal pitch. There  is no rebounding tenderness or guarding. There are no signs of ascites or peritonitis. The liver and spleen are nonpalpable. There are no palpable masses.   The patient has a large right inguinal hernia with a 10 cm hernia sac and a small left inguinal hernia with a 5 cm hernia sac. He also has a small umbilical hernia with an approximately 3 cm hernia sac    The patient will be scheduled to undergo a laparoscopic bilateral inguinal hernia with mesh and an umbilical herniorrhaphy at [facility] in [facility]. I instructed the patient to inform the surgical team that he has spinal issues prior to his operation, so his issues are not further exacerbated during transfers. All risks, benefits, complications and alternatives to the proposed procedure(s) were fully discussed with the patient. All questions from the patient were answered in detail. A description of the procedure(s) possible outcomes was fully discussed. The patient seemed to understand the conversation and its details. Consent for the procedure(s) was confirmed with the patient.       Sincerely,       La Nena Chacon MD   CC: No Recipients